# Patient Record
Sex: MALE | Race: WHITE | URBAN - METROPOLITAN AREA
[De-identification: names, ages, dates, MRNs, and addresses within clinical notes are randomized per-mention and may not be internally consistent; named-entity substitution may affect disease eponyms.]

---

## 2023-01-08 ENCOUNTER — EMERGENCY (EMERGENCY)
Facility: HOSPITAL | Age: 78
LOS: 1 days | Discharge: HOME | End: 2023-01-08
Attending: EMERGENCY MEDICINE | Admitting: EMERGENCY MEDICINE
Payer: MEDICARE

## 2023-01-08 VITALS
HEART RATE: 108 BPM | TEMPERATURE: 98 F | RESPIRATION RATE: 18 BRPM | DIASTOLIC BLOOD PRESSURE: 87 MMHG | WEIGHT: 175.05 LBS | OXYGEN SATURATION: 97 % | SYSTOLIC BLOOD PRESSURE: 141 MMHG | HEIGHT: 75 IN

## 2023-01-08 PROCEDURE — 99284 EMERGENCY DEPT VISIT MOD MDM: CPT

## 2023-01-08 RX ORDER — VALACYCLOVIR 500 MG/1
1 TABLET, FILM COATED ORAL
Qty: 21 | Refills: 0
Start: 2023-01-08 | End: 2023-01-14

## 2023-01-08 RX ORDER — OFLOXACIN 0.3 %
1 DROPS OPHTHALMIC (EYE)
Qty: 5 | Refills: 0
Start: 2023-01-08

## 2023-01-08 RX ADMIN — Medication 50 MILLIGRAM(S): at 12:17

## 2023-01-08 NOTE — ED PROVIDER NOTE - CARE PROVIDER_API CALL
Lucie Hernandes)  Ophthalmology  [address] [place] [id number]  Phone: [phone]  Fax: [phone]  Follow Up Time:

## 2023-01-08 NOTE — ED PROVIDER NOTE - NSFOLLOWUPINSTRUCTIONS_ED_ALL_ED_FT
Go straight to [facility] for further evaluation of your eye.  [facility]  [facility]  Address:  [address]  ([address])  [place] [address]  Phone:	[phone]    Additional Info:  Hours:  Monday - Sunday: 4 pm - 8 am  Including all major holidays      Take Valtrex 1000mg (1g) THREE TIMES DAILY for one week.        Shingles    A rash on the skin.   Shingles is an infection. It gives you a painful skin rash and blisters that have fluid in them. Shingles is caused by the same germ (virus) that causes chickenpox.    Shingles only happens in people who:  •Have had chickenpox.      •Have been given a shot (vaccine) to protect against chickenpox. Shingles is rare in this group.        What are the causes?    This condition is caused by varicella-zoster virus. This is the same germ that causes chickenpox. After a person is exposed to the germ, the germ stays in the body but is not active (dormant).    Shingles develops if the germ becomes active again (is reactivated). This can happen many years after the first exposure to the germ. It is not known what causes this germ to become active again.      What increases the risk?    People who have had chickenpox or received the chickenpox shot are at risk for shingles. This infection is more common in people who:  •Are older than 60 years of age.    •Have a weakened disease-fighting system (immune system), such as people with:  •HIV (human immunodeficiency virus).      •AIDS (acquired immunodeficiency syndrome).      •Cancer.        •Are taking medicines that weaken the immune system, such as organ transplant medicines.      •Have a lot of stress.        What are the signs or symptoms?    The first symptoms of shingles may be itching, tingling, or pain in an area on your skin.    A rash will show on your skin a few days or weeks later. This is what usually happens:  •The rash is likely to be on one side of your body.       •The rash usually has a shape like a belt or a band. Over time, the rash turns into fluid-filled blisters.      •The blisters will break open and change into scabs.      •The scabs usually dry up in about 2–3 weeks.      You may also have:  •A fever.      •Chills.      •A headache.      •A feeling like you may vomit (nausea).        How is this treated?    The rash may last for several weeks. There is not a specific cure for this condition.    Your doctor may prescribe medicines. Medicines may:  •Help with pain.      •Help you get better sooner.      •Help to prevent long-term problems.      •Help with itching (antihistamines).      If the area involved is on your face, you may need to see a specialist. This may be an eye doctor or an ear, nose, and throat (ENT) doctor.      Follow these instructions at home:    Medicines     •Take over-the-counter and prescription medicines only as told by your doctor.      •Put on an anti-itch cream or numbing cream where you have a rash, blisters, or scabs. Do this as told by your doctor.        Helping with itching and discomfort   A bathtub filled with water.    •Put cold, wet cloths (cold compresses) on the area of the rash or blisters as told by your doctor.      •Cool baths can help you feel better. Try adding baking soda or dry oatmeal to the water to lessen itching. Do not bathe in hot water.      •Use calamine lotion as told by your doctor.      Blister and rash care     •Keep your rash covered with a loose bandage (dressing).       •Wear loose clothing that does not rub on your rash.      •Wash your hands with soap and water for at least 20 seconds before and after you change your bandage. If you cannot use soap and water, use hand .      •Change your bandage as told by your doctor.      •Keep your rash and blisters clean. To do this, wash the area with mild soap and cool water as told by your doctor.    •Check your rash every day for signs of infection. Check for:  •More redness, swelling, or pain.      •Fluid or blood.      •Warmth.      •Pus or a bad smell.      • Do not scratch your rash. Do not pick at your blisters. To help you to not scratch:  •Keep your fingernails clean and cut short.      •Wear gloves or mittens when you sleep, if scratching is a problem.        General instructions     •Rest as told by your doctor.      •Wash your hands often with soap and water for at least 20 seconds. If you cannot use soap and water, use hand . Doing this lowers your chance of getting a skin infection.    •Your infection can cause chickenpox in people who have never had chickenpox or never got a chickenpox vaccine shot. If you have blisters that did not change into scabs yet, try not to touch other people or be around other people, especially:  •Babies.      •Pregnant women.      •Children who have areas of red, itchy, or rough skin (eczema).      •Older people who have organ transplants.      •People who have a long-term (chronic) illness, like cancer or AIDS.        •Keep all follow-up visits.        How is this prevented?    A vaccine shot is the best way to prevent shingles and protect against shingles problems.    If you have not had a vaccine shot, talk with your doctor about getting it.      Where to find more information    •Centers for Disease Control and Prevention: www.cdc.gov        Contact a doctor if:    •Your pain does not get better with medicine.      •Your pain does not get better after the rash heals.    •You have any of these signs of infection around the rash:  •More redness, swelling, or pain.      •Fluid or blood.      •Warmth.      •Pus or a bad smell.        •You have a fever.        Get help right away if:    •The rash is on your face or nose.      •You have pain in your face or pain by your eye.      •You lose feeling on one side of your face.      •You have trouble seeing.      •You have ear pain, or you have ringing in your ear.      •You have a loss of taste.      •Your condition gets worse.        Summary    •Shingles gives you a painful skin rash and blisters that have fluid in them.      •Shingles is caused by the same germ (virus) that causes chickenpox.      •Keep your rash covered with a loose bandage. Wear loose clothing that does not rub on your rash.      •If you have blisters that did not change into scabs yet, try not to touch other people or be around people.      This information is not intended to replace advice given to you by your health care provider. Make sure you discuss any questions you have with your health care provider.

## 2023-01-08 NOTE — ED PROVIDER NOTE - CLINICAL SUMMARY MEDICAL DECISION MAKING FREE TEXT BOX
76 y/o M with no significant PMH presents with shingles rash for 1 week and came to the ED today for evaluation of right eye. On exam patient has rash consistent with shingles in right V1 distribution. Fluorescin exam negative for dendritic lesions or ulcerations. Will increase dose of valtrex to appropriate dosage (1g PO TID for 1 week), start PO steroid, give topical antibiotics for eye given small amounts of purulent discharge, and refer for close follow up with opthalmology for further evaluation.

## 2023-01-08 NOTE — ED PROVIDER NOTE - EYES, MLM
Corrected visual acuity OD 20/25 - 20/20, OS 20/20, +Right eye conjunctival injection and small amount of purulent discharge. PERRL. Fluorescin exam negative.

## 2023-01-08 NOTE — ED PROVIDER NOTE - OBJECTIVE STATEMENT
76 y/o M with Hx of BPH, presenting with rash to right forehead for 1 week associated with right eye redness. Patient states he saw his urologist for another issue and his urologist said it looked like he had shingles and started him on Valtrex 1g PO daily for 10 days. Patient states he has not had any pain in the area of the rash and no pain in his eye. Denies changes in vision. He states he read online that if shingles affects your eye it can be dangerous to your vision so he came to the ED for evaluation of the eye. Patient does not have outpatient ophthalmologist.

## 2023-01-10 DIAGNOSIS — R21 RASH AND OTHER NONSPECIFIC SKIN ERUPTION: ICD-10-CM

## 2023-01-10 DIAGNOSIS — B02.9 ZOSTER WITHOUT COMPLICATIONS: ICD-10-CM

## 2023-01-10 DIAGNOSIS — N40.0 BENIGN PROSTATIC HYPERPLASIA WITHOUT LOWER URINARY TRACT SYMPTOMS: ICD-10-CM

## 2024-12-13 NOTE — ED PROVIDER NOTE - PATIENT PORTAL LINK FT
You can access the FollowMyHealth Patient Portal offered by [facility] by registering at the following website: [email or web address]. By joining [employer]’s FollowMyHealth portal, you will also be able to view your health information using other applications (apps) compatible with our system.
Pt caregiver goal stated....to increase mobility.